# Patient Record
Sex: MALE | Race: WHITE | NOT HISPANIC OR LATINO | Employment: FULL TIME | URBAN - METROPOLITAN AREA
[De-identification: names, ages, dates, MRNs, and addresses within clinical notes are randomized per-mention and may not be internally consistent; named-entity substitution may affect disease eponyms.]

---

## 2021-05-12 ENCOUNTER — OFFICE VISIT (OUTPATIENT)
Dept: CARDIOLOGY | Facility: CLINIC | Age: 51
End: 2021-05-12

## 2021-05-12 VITALS
OXYGEN SATURATION: 97 % | SYSTOLIC BLOOD PRESSURE: 116 MMHG | BODY MASS INDEX: 27.85 KG/M2 | DIASTOLIC BLOOD PRESSURE: 70 MMHG | WEIGHT: 205.6 LBS | HEIGHT: 72 IN | HEART RATE: 65 BPM

## 2021-05-12 DIAGNOSIS — E78.00 PURE HYPERCHOLESTEROLEMIA: ICD-10-CM

## 2021-05-12 DIAGNOSIS — I25.10 CORONARY ARTERY DISEASE INVOLVING NATIVE CORONARY ARTERY OF NATIVE HEART, ANGINA PRESENCE UNSPECIFIED: Primary | ICD-10-CM

## 2021-05-12 DIAGNOSIS — I10 ESSENTIAL HYPERTENSION: ICD-10-CM

## 2021-05-12 DIAGNOSIS — I25.118 CORONARY ARTERY DISEASE INVOLVING NATIVE CORONARY ARTERY OF NATIVE HEART WITH OTHER FORM OF ANGINA PECTORIS (HCC): Primary | ICD-10-CM

## 2021-05-12 DIAGNOSIS — Z72.0 TOBACCO USE: ICD-10-CM

## 2021-05-12 DIAGNOSIS — E78.00 PURE HYPERCHOLESTEROLEMIA: Primary | ICD-10-CM

## 2021-05-12 PROCEDURE — 93000 ELECTROCARDIOGRAM COMPLETE: CPT | Performed by: INTERNAL MEDICINE

## 2021-05-12 PROCEDURE — 99204 OFFICE O/P NEW MOD 45 MIN: CPT | Performed by: INTERNAL MEDICINE

## 2021-05-12 RX ORDER — CHLORAL HYDRATE 500 MG
CAPSULE ORAL
COMMUNITY

## 2021-05-12 RX ORDER — NITROGLYCERIN 400 UG/1
1 SPRAY ORAL
COMMUNITY

## 2021-05-12 RX ORDER — ASPIRIN 81 MG/1
81 TABLET, CHEWABLE ORAL DAILY
COMMUNITY

## 2021-05-12 RX ORDER — ATORVASTATIN CALCIUM 40 MG/1
40 TABLET, FILM COATED ORAL DAILY
Qty: 30 TABLET | Refills: 5 | Status: SHIPPED | OUTPATIENT
Start: 2021-05-12 | End: 2021-11-19

## 2021-05-12 RX ORDER — LISINOPRIL 5 MG/1
5 TABLET ORAL DAILY
COMMUNITY

## 2021-05-12 RX ORDER — MULTIVIT WITH MINERALS/LUTEIN
250 TABLET ORAL DAILY
COMMUNITY

## 2021-05-12 RX ORDER — ATORVASTATIN CALCIUM 80 MG/1
80 TABLET, FILM COATED ORAL DAILY
COMMUNITY
End: 2021-05-12 | Stop reason: DRUGHIGH

## 2021-05-12 RX ORDER — CLOPIDOGREL BISULFATE 75 MG/1
75 TABLET ORAL DAILY
COMMUNITY

## 2021-05-12 NOTE — PROGRESS NOTES
Subjective:     Encounter Date:05/12/2021    Primary Care Physician: Richard Hale DO      Patient ID: Terrell Ashford is a 50 y.o. male.    Chief Complaint:Advice Only (past due on stress test) and Fatigue    PROBLEM LIST:  1. Coronary artery disease  a. 2006 [facility] reported stenting, incomplete database.  b. 6/2007 [facility] for MI no significant left main disease.  Proximally occluded LAD distally fills via right to left collaterals (3.5 x 15 ) with a 4 x 15  stent placed just proximally in tandem fashion..  Circumflex luminal irregularities.  RCA mid 70%.  EF 45 to 50%  c. 12/1/2018 anterior MI occluded proximal LAD balloon angioplasty 3.5 x 20 mm balloon, Dr. Jara.  70 to 80% proximal to mid RCA.  Circumflex free of disease.  No LVEF documented.  2. Hypertension  3. Dyslipidemia  4. Ongoing tobacco abuse  5. COVID 19 4/2021  a. Mild symptoms  6. Surgeries:  a. None       Allergies   Allergen Reactions   • Chantix [Varenicline Tartrate] Delirium         Current Outpatient Medications:   •  aspirin 81 MG chewable tablet, Chew 81 mg Daily., Disp: , Rfl:   •  atorvastatin (LIPITOR) 80 MG tablet, Take 80 mg by mouth Daily., Disp: , Rfl:   •  clopidogrel (PLAVIX) 75 MG tablet, Take 75 mg by mouth Daily., Disp: , Rfl:   •  lisinopril (PRINIVIL,ZESTRIL) 5 MG tablet, Take 5 mg by mouth Daily., Disp: , Rfl:   •  metoprolol tartrate (LOPRESSOR) 25 MG tablet, Take 25 mg by mouth 2 (Two) Times a Day., Disp: , Rfl:   •  nitroglycerin (NITROLINGUAL) 0.4 MG/SPRAY spray, Place 1 spray under the tongue Every 5 (Five) Minutes As Needed for Chest Pain., Disp: , Rfl:   •  Omega-3 Fatty Acids (fish oil) 1000 MG capsule capsule, Take  by mouth Daily With Breakfast., Disp: , Rfl:   •  vitamin C (ASCORBIC ACID) 250 MG tablet, Take 250 mg by mouth Daily., Disp: , Rfl:         History of Present Illness    Patient is a 50-year-old  male who we are seeing today for establishment of cardiac care secondary to  "previous history of coronary artery disease with previous myocardial infarctions and stenting.  Patient recently changed primary care providers and wished to have a cardiologist in [place].  He notes some occasional chest pain.  No specific triggering factors noted.  Does not feel like his anginal symptoms he had with his previous heart attacks.  Denies any increase shortness of breath.  No syncope, near-syncope, or edema.  Complains of \"running out of gas easy\".  Also notes some generalized leg and joint soreness which has been thought to be possibly related to his high intensity statin.  He was referred here for further evaluation and consideration of possible decrease of dosage and/or addition of PCSK9 inhibitor.  Patient notes that he had COVID-19 in April but did not require hospitalization or additional medical therapy.    The following portions of the patient's history were reviewed and updated as appropriate: allergies, current medications, past family history, past medical history, past social history, past surgical history and problem list.    Family History   Problem Relation Age of Onset   • Heart disease Father        Social History     Tobacco Use   • Smoking status: Current Every Day Smoker     Packs/day: 1.00     Years: 30.00     Pack years: 30.00     Types: Cigarettes   Substance Use Topics   • Alcohol use: Not Currently   • Drug use: Never         Review of Systems   Constitutional: Positive for malaise/fatigue. Negative for fever.   HENT: Negative for nosebleeds.    Eyes: Negative for redness and visual disturbance.   Cardiovascular: Positive for chest pain. Negative for orthopnea, palpitations and paroxysmal nocturnal dyspnea.   Respiratory: Negative for cough, snoring, sputum production and wheezing.    Hematologic/Lymphatic: Negative for bleeding problem.   Skin: Negative for flushing, itching and rash.   Musculoskeletal: Positive for arthritis and myalgias. Negative for falls, joint pain " "and muscle cramps.   Gastrointestinal: Negative for abdominal pain, diarrhea, heartburn, nausea and vomiting.   Genitourinary: Negative for hematuria.   Neurological: Negative for excessive daytime sleepiness, dizziness, headaches, tremors and weakness.   Psychiatric/Behavioral: Negative for substance abuse. The patient is not nervous/anxious.           Objective:   /70   Pulse 65   Ht 182.9 cm (72\")   Wt 93.3 kg (205 lb 9.6 oz)   SpO2 97%   BMI 27.88 kg/m²         Physical Exam      ECG 12 Lead    Date/Time: 5/12/2021 3:13 PM  Performed by: Miguel Saleh MD  Authorized by: Miguel Saleh MD   Comparison: not compared with previous ECG   Previous ECG: no previous ECG available  Rhythm: sinus rhythm    Clinical impression: abnormal EKG  Comments: Old AMI                  Assessment:   Assessment/Plan      Diagnoses and all orders for this visit:    1. Coronary artery disease involving native coronary artery of native heart with other form of angina pectoris (CMS/HCC) (Primary)  -     ECG 12 Lead    2. Pure hypercholesterolemia    3. Essential hypertension    4. Tobacco use      1.  Coronary artery disease status post 2 anterior MIs in the remote past.  No recent LVEF.  No current angina.  2.  Dyslipidemia last LDL of 37 on high-dose statin.  Severe myalgias from high-dose statin.  3.  Hypertension well-controlled on beta-blocker and ACE inhibitor  4.  Ongoing tobacco abuse, intolerant to Chantix    Recommendations:  1.  We will check LVEF with echocardiogram.  2.  Stress myocardial perfusion images, no ischemic evaluation in 4 years with little if any warning symptoms prior to his myocardial infarction stent 3.  Given his severe myalgias and LDL of only 37 will decrease the atorvastatin to 40 mg nightly.  Recheck fasting lipid profile in 3 months time.  4.  Discussed smoking cessation strategies.  5.  Revisit annually apparent symptom change       Leslie HADLEY scribed portions of this " dictation for Dr. Miguel Saleh.   I have seen and examined the patient, I have reviewed the note, discussed the case with the advance practice clinician, made necessary changes and I agree with the final note.    Miguel Saleh MD  05/12/21  15:59 EDT        Dictated utilizing Dragon dictation

## 2021-08-04 ENCOUNTER — HOSPITAL ENCOUNTER (OUTPATIENT)
Dept: CARDIOLOGY | Facility: HOSPITAL | Age: 51
Discharge: HOME | End: 2021-08-04
Admitting: INTERNAL MEDICINE

## 2021-08-04 VITALS — WEIGHT: 205 LBS | HEIGHT: 71 IN | BODY MASS INDEX: 28.7 KG/M2

## 2021-08-04 DIAGNOSIS — I25.10 CORONARY ARTERY DISEASE INVOLVING NATIVE CORONARY ARTERY OF NATIVE HEART, ANGINA PRESENCE UNSPECIFIED: ICD-10-CM

## 2021-08-04 PROCEDURE — 93306 TTE W/DOPPLER COMPLETE: CPT | Performed by: INTERNAL MEDICINE

## 2021-08-04 PROCEDURE — 93306 TTE W/DOPPLER COMPLETE: CPT

## 2021-08-05 LAB
ASCENDING AORTA: 3.7 CM
BH CV ECHO MEAS - AO MAX PG (FULL): 6.6 MMHG
BH CV ECHO MEAS - AO MAX PG: 9.2 MMHG
BH CV ECHO MEAS - AO MEAN PG (FULL): 4 MMHG
BH CV ECHO MEAS - AO MEAN PG: 5.5 MMHG
BH CV ECHO MEAS - AO ROOT AREA (BSA CORRECTED): 1.6
BH CV ECHO MEAS - AO ROOT AREA: 9.6 CM^2
BH CV ECHO MEAS - AO ROOT DIAM: 3.5 CM
BH CV ECHO MEAS - AO V2 MAX: 152 CM/SEC
BH CV ECHO MEAS - AO V2 MEAN: 110.3 CM/SEC
BH CV ECHO MEAS - AO V2 VTI: 31.1 CM
BH CV ECHO MEAS - ASC AORTA: 3.6 CM
BH CV ECHO MEAS - AVA(I,A): 2.1 CM^2
BH CV ECHO MEAS - AVA(I,D): 2.1 CM^2
BH CV ECHO MEAS - AVA(V,A): 1.9 CM^2
BH CV ECHO MEAS - AVA(V,D): 1.9 CM^2
BH CV ECHO MEAS - BSA(HAYCOCK): 2.2 M^2
BH CV ECHO MEAS - BSA: 2.2 M^2
BH CV ECHO MEAS - BZI_BMI: 27.8 KILOGRAMS/M^2
BH CV ECHO MEAS - BZI_METRIC_HEIGHT: 182.9 CM
BH CV ECHO MEAS - BZI_METRIC_WEIGHT: 93 KG
BH CV ECHO MEAS - EDV(CUBED): 213.5 ML
BH CV ECHO MEAS - EDV(MOD-SP2): 186 ML
BH CV ECHO MEAS - EDV(MOD-SP4): 196 ML
BH CV ECHO MEAS - EDV(TEICH): 178.4 ML
BH CV ECHO MEAS - EF(CUBED): 52.4 %
BH CV ECHO MEAS - EF(MOD-BP): 40 %
BH CV ECHO MEAS - EF(MOD-SP2): 60.8 %
BH CV ECHO MEAS - EF(MOD-SP4): 29.1 %
BH CV ECHO MEAS - EF(TEICH): 43.6 %
BH CV ECHO MEAS - ESV(CUBED): 101.6 ML
BH CV ECHO MEAS - ESV(MOD-SP2): 73 ML
BH CV ECHO MEAS - ESV(MOD-SP4): 139 ML
BH CV ECHO MEAS - ESV(TEICH): 100.6 ML
BH CV ECHO MEAS - FS: 21.9 %
BH CV ECHO MEAS - IVS/LVPW: 0.74
BH CV ECHO MEAS - IVSD: 0.75 CM
BH CV ECHO MEAS - LA DIMENSION: 3.4 CM
BH CV ECHO MEAS - LA/AO: 0.97
BH CV ECHO MEAS - LAD MAJOR: 5 CM
BH CV ECHO MEAS - LAT PEAK E' VEL: 10 CM/SEC
BH CV ECHO MEAS - LATERAL E/E' RATIO: 10.5
BH CV ECHO MEAS - LV DIASTOLIC VOL/BSA (35-75): 91 ML/M^2
BH CV ECHO MEAS - LV IVRT: 0.08 SEC
BH CV ECHO MEAS - LV MASS(C)D: 209.4 GRAMS
BH CV ECHO MEAS - LV MASS(C)DI: 97.3 GRAMS/M^2
BH CV ECHO MEAS - LV MAX PG: 2.6 MMHG
BH CV ECHO MEAS - LV MEAN PG: 1.5 MMHG
BH CV ECHO MEAS - LV SYSTOLIC VOL/BSA (12-30): 64.6 ML/M^2
BH CV ECHO MEAS - LV V1 MAX: 80.8 CM/SEC
BH CV ECHO MEAS - LV V1 MEAN: 56.5 CM/SEC
BH CV ECHO MEAS - LV V1 VTI: 18.9 CM
BH CV ECHO MEAS - LVIDD: 6 CM
BH CV ECHO MEAS - LVIDS: 4.7 CM
BH CV ECHO MEAS - LVLD AP2: 9.2 CM
BH CV ECHO MEAS - LVLD AP4: 9.4 CM
BH CV ECHO MEAS - LVLS AP2: 7.8 CM
BH CV ECHO MEAS - LVLS AP4: 7.9 CM
BH CV ECHO MEAS - LVOT AREA (M): 3.5 CM^2
BH CV ECHO MEAS - LVOT AREA: 3.5 CM^2
BH CV ECHO MEAS - LVOT DIAM: 2.1 CM
BH CV ECHO MEAS - LVPWD: 1 CM
BH CV ECHO MEAS - MED PEAK E' VEL: 12.2 CM/SEC
BH CV ECHO MEAS - MEDIAL E/E' RATIO: 8.6
BH CV ECHO MEAS - MV A MAX VEL: 68.5 CM/SEC
BH CV ECHO MEAS - MV DEC SLOPE: 518.2 CM/SEC^2
BH CV ECHO MEAS - MV DEC TIME: 0.18 SEC
BH CV ECHO MEAS - MV E MAX VEL: 106.7 CM/SEC
BH CV ECHO MEAS - MV E/A: 1.6
BH CV ECHO MEAS - MV MAX PG: 4.3 MMHG
BH CV ECHO MEAS - MV MEAN PG: 1.6 MMHG
BH CV ECHO MEAS - MV P1/2T MAX VEL: 112 CM/SEC
BH CV ECHO MEAS - MV P1/2T: 63.3 MSEC
BH CV ECHO MEAS - MV V2 MAX: 103.2 CM/SEC
BH CV ECHO MEAS - MV V2 MEAN: 58.1 CM/SEC
BH CV ECHO MEAS - MV V2 VTI: 34.8 CM
BH CV ECHO MEAS - MVA P1/2T LCG: 2 CM^2
BH CV ECHO MEAS - MVA(P1/2T): 3.5 CM^2
BH CV ECHO MEAS - MVA(VTI): 1.9 CM^2
BH CV ECHO MEAS - PA ACC SLOPE: 620.8 CM/SEC^2
BH CV ECHO MEAS - PA ACC TIME: 0.16 SEC
BH CV ECHO MEAS - PA MAX PG: 5.8 MMHG
BH CV ECHO MEAS - PA PR(ACCEL): 5.3 MMHG
BH CV ECHO MEAS - PA V2 MAX: 120.5 CM/SEC
BH CV ECHO MEAS - RAP SYSTOLE: 3 MMHG
BH CV ECHO MEAS - RVSP: 28 MMHG
BH CV ECHO MEAS - SI(AO): 138.7 ML/M^2
BH CV ECHO MEAS - SI(CUBED): 52 ML/M^2
BH CV ECHO MEAS - SI(LVOT): 31 ML/M^2
BH CV ECHO MEAS - SI(MOD-SP2): 52.5 ML/M^2
BH CV ECHO MEAS - SI(MOD-SP4): 26.5 ML/M^2
BH CV ECHO MEAS - SI(TEICH): 36.1 ML/M^2
BH CV ECHO MEAS - SV(AO): 298.7 ML
BH CV ECHO MEAS - SV(CUBED): 111.9 ML
BH CV ECHO MEAS - SV(LVOT): 66.7 ML
BH CV ECHO MEAS - SV(MOD-SP2): 113 ML
BH CV ECHO MEAS - SV(MOD-SP4): 57 ML
BH CV ECHO MEAS - SV(TEICH): 77.8 ML
BH CV ECHO MEAS - TAPSE (>1.6): 2.3 CM
BH CV ECHO MEAS - TR MAX PG: 25 MMHG
BH CV ECHO MEAS - TR MAX VEL: 250 CM/SEC
BH CV ECHO MEASUREMENTS AVERAGE E/E' RATIO: 9.61
BH CV VAS BP RIGHT ARM: NORMAL MMHG
BH CV XLRA - RV BASE: 3.9 CM
BH CV XLRA - RV LENGTH: 8.2 CM
BH CV XLRA - RV MID: 2.4 CM
BH CV XLRA - TDI S': 17 CM/SEC
IVRT: 77 MSEC
LEFT ATRIUM VOLUME INDEX: 22.3 ML/M^2
LEFT ATRIUM VOLUME: 48 ML
LV EF 2D ECHO EST: 40 %

## 2021-10-07 ENCOUNTER — TELEPHONE (OUTPATIENT)
Dept: CARDIOLOGY | Facility: CLINIC | Age: 51
End: 2021-10-07

## 2021-10-07 NOTE — TELEPHONE ENCOUNTER
Requesting written and oral risk assessment for dental extractions scheduled for 2:00 today.   Stable

## 2021-11-19 RX ORDER — ATORVASTATIN CALCIUM 40 MG/1
TABLET, FILM COATED ORAL
Qty: 90 TABLET | Refills: 1 | Status: SHIPPED | OUTPATIENT
Start: 2021-11-19 | End: 2022-05-23

## 2022-01-24 ENCOUNTER — TRANSCRIBE ORDERS (OUTPATIENT)
Dept: ADMINISTRATIVE | Facility: HOSPITAL | Age: 52
End: 2022-01-24

## 2022-01-24 DIAGNOSIS — R07.9 CHEST PAIN, UNSPECIFIED TYPE: Primary | ICD-10-CM

## 2022-02-17 ENCOUNTER — HOSPITAL ENCOUNTER (OUTPATIENT)
Dept: CARDIOLOGY | Facility: HOSPITAL | Age: 52
Discharge: HOME | End: 2022-02-17
Admitting: INTERNAL MEDICINE

## 2022-02-17 VITALS
WEIGHT: 205 LBS | HEART RATE: 71 BPM | BODY MASS INDEX: 28.7 KG/M2 | DIASTOLIC BLOOD PRESSURE: 78 MMHG | SYSTOLIC BLOOD PRESSURE: 110 MMHG | HEIGHT: 71 IN

## 2022-02-17 DIAGNOSIS — R07.9 CHEST PAIN, UNSPECIFIED TYPE: ICD-10-CM

## 2022-02-17 LAB
BH CV REST NUCLEAR ISOTOPE DOSE: 9.1 MCI
BH CV STRESS BP STAGE 1: NORMAL
BH CV STRESS BP STAGE 2: NORMAL
BH CV STRESS BP STAGE 3: NORMAL
BH CV STRESS DURATION MIN STAGE 1: 3
BH CV STRESS DURATION MIN STAGE 2: 3
BH CV STRESS DURATION MIN STAGE 3: 3
BH CV STRESS DURATION MIN STAGE 4: 1
BH CV STRESS DURATION SEC STAGE 1: 0
BH CV STRESS DURATION SEC STAGE 2: 0
BH CV STRESS DURATION SEC STAGE 3: 0
BH CV STRESS DURATION SEC STAGE 4: 15
BH CV STRESS GRADE STAGE 1: 10
BH CV STRESS GRADE STAGE 2: 12
BH CV STRESS GRADE STAGE 3: 14
BH CV STRESS GRADE STAGE 4: 16
BH CV STRESS HR STAGE 1: 101
BH CV STRESS HR STAGE 2: 122
BH CV STRESS HR STAGE 3: 139
BH CV STRESS HR STAGE 4: 155
BH CV STRESS METS STAGE 1: 5
BH CV STRESS METS STAGE 2: 7.5
BH CV STRESS METS STAGE 3: 10
BH CV STRESS METS STAGE 4: 13.5
BH CV STRESS NUCLEAR ISOTOPE DOSE: 32.9 MCI
BH CV STRESS O2 STAGE 1: 96
BH CV STRESS O2 STAGE 2: 97
BH CV STRESS O2 STAGE 3: 96
BH CV STRESS O2 STAGE 4: 96
BH CV STRESS PROTOCOL 1: NORMAL
BH CV STRESS RECOVERY BP: NORMAL MMHG
BH CV STRESS RECOVERY HR: 88 BPM
BH CV STRESS RECOVERY O2: 97 %
BH CV STRESS SPEED STAGE 1: 1.7
BH CV STRESS SPEED STAGE 2: 2.5
BH CV STRESS SPEED STAGE 3: 3.4
BH CV STRESS SPEED STAGE 4: 4.2
BH CV STRESS STAGE 1: 1
BH CV STRESS STAGE 2: 2
BH CV STRESS STAGE 3: 3
BH CV STRESS STAGE 4: 4
LV EF NUC BP: 43 %
MAXIMAL PREDICTED HEART RATE: 169 BPM
PERCENT MAX PREDICTED HR: 92.9 %
STRESS BASELINE BP: NORMAL MMHG
STRESS BASELINE HR: 62 BPM
STRESS O2 SAT REST: 96 %
STRESS PERCENT HR: 109 %
STRESS POST ESTIMATED WORKLOAD: 12.1 METS
STRESS POST EXERCISE DUR MIN: 10 MIN
STRESS POST EXERCISE DUR SEC: 15 SEC
STRESS POST O2 SAT PEAK: 96 %
STRESS POST PEAK BP: NORMAL MMHG
STRESS POST PEAK HR: 157 BPM
STRESS TARGET HR: 144 BPM

## 2022-02-17 PROCEDURE — 78452 HT MUSCLE IMAGE SPECT MULT: CPT | Performed by: INTERNAL MEDICINE

## 2022-02-17 PROCEDURE — 93017 CV STRESS TEST TRACING ONLY: CPT

## 2022-02-17 PROCEDURE — 93018 CV STRESS TEST I&R ONLY: CPT | Performed by: INTERNAL MEDICINE

## 2022-02-17 PROCEDURE — 78452 HT MUSCLE IMAGE SPECT MULT: CPT

## 2022-02-17 PROCEDURE — 0 TECHNETIUM SESTAMIBI: Performed by: INTERNAL MEDICINE

## 2022-02-17 PROCEDURE — A9500 TC99M SESTAMIBI: HCPCS | Performed by: INTERNAL MEDICINE

## 2022-02-17 RX ADMIN — TECHNETIUM TC 99M SESTAMIBI 1 DOSE: 1 INJECTION INTRAVENOUS at 13:50

## 2022-02-17 RX ADMIN — TECHNETIUM TC 99M SESTAMIBI 1 DOSE: 1 INJECTION INTRAVENOUS at 12:05

## 2022-03-07 ENCOUNTER — APPOINTMENT (OUTPATIENT)
Dept: CARDIOLOGY | Facility: HOSPITAL | Age: 52
End: 2022-03-07

## 2022-03-24 ENCOUNTER — TELEPHONE (OUTPATIENT)
Dept: CARDIOLOGY | Facility: CLINIC | Age: 52
End: 2022-03-24

## 2022-03-24 DIAGNOSIS — R94.39 ABNORMAL STRESS TEST: ICD-10-CM

## 2022-03-24 DIAGNOSIS — I10 ESSENTIAL HYPERTENSION: Primary | ICD-10-CM

## 2022-03-24 NOTE — TELEPHONE ENCOUNTER
Left VM advising of below and to call office with questions or concerns    ----- Message from JOMAR Smalls sent at 3/23/2022  4:07 PM EDT -----  Please call patient and let him know that his stress test shows his old heart attack.  There is some mild chris-infarct ischemia.  However, if patient is asymptomatic no further testing needed at this time and may continue medical therapy.  However, if patient is having symptoms would recommend cardiac catheterization plus or minus intervention.

## 2022-03-24 NOTE — PROGRESS NOTES
Spoke with patient, he states thinks heart cath is way to proceed, given his previous episodes, as well as PCP was leaning that way.  Advised  will contact to set up.

## 2022-04-12 PROBLEM — R94.39 ABNORMAL STRESS TEST: Status: ACTIVE | Noted: 2022-04-12

## 2022-04-21 ENCOUNTER — TELEPHONE (OUTPATIENT)
Dept: CARDIOLOGY | Facility: CLINIC | Age: 52
End: 2022-04-21

## 2022-04-21 NOTE — TELEPHONE ENCOUNTER
Spoke with patient, he states he would like to wait and discuss his stress test with both Dr. Saleh and Dr. Hale.  He states he has no symptoms at this time. He has an appt scheduled for 5/25/22 and will discuss results/options at that time.

## 2022-05-23 RX ORDER — ATORVASTATIN CALCIUM 40 MG/1
TABLET, FILM COATED ORAL
Qty: 90 TABLET | Refills: 1 | Status: SHIPPED | OUTPATIENT
Start: 2022-05-23

## 2022-05-25 ENCOUNTER — OFFICE VISIT (OUTPATIENT)
Dept: CARDIOLOGY | Facility: CLINIC | Age: 52
End: 2022-05-25

## 2022-05-25 VITALS
HEART RATE: 65 BPM | WEIGHT: 202 LBS | BODY MASS INDEX: 27.36 KG/M2 | SYSTOLIC BLOOD PRESSURE: 114 MMHG | OXYGEN SATURATION: 97 % | HEIGHT: 72 IN | DIASTOLIC BLOOD PRESSURE: 60 MMHG

## 2022-05-25 DIAGNOSIS — I10 ESSENTIAL HYPERTENSION: ICD-10-CM

## 2022-05-25 DIAGNOSIS — Z72.0 TOBACCO USE: ICD-10-CM

## 2022-05-25 DIAGNOSIS — I25.118 CORONARY ARTERY DISEASE INVOLVING NATIVE CORONARY ARTERY OF NATIVE HEART WITH OTHER FORM OF ANGINA PECTORIS: Primary | ICD-10-CM

## 2022-05-25 DIAGNOSIS — R94.39 ABNORMAL STRESS TEST: ICD-10-CM

## 2022-05-25 DIAGNOSIS — E78.00 PURE HYPERCHOLESTEROLEMIA: ICD-10-CM

## 2022-05-25 PROCEDURE — 99214 OFFICE O/P EST MOD 30 MIN: CPT | Performed by: INTERNAL MEDICINE

## 2022-05-25 NOTE — PROGRESS NOTES
Subjective:     Encounter Date:05/25/2022    Primary Care Physician: Richard Hale DO      Patient ID: Terrell Ashford is a 51 y.o. male.    Chief Complaint:Follow-up    PROBLEM LIST:  1. Coronary artery disease  a. 2006 [facility] reported stenting, incomplete database.  b. 6/2007 [facility] for MI no significant left main disease.  Proximally occluded LAD distally fills via right to left collaterals (3.5 x 15 ) with a 4 x 15  stent placed just proximally in tandem fashion..  Circumflex luminal irregularities.  RCA mid 70%.  EF 45 to 50%  c. 12/1/2018 anterior MI occluded proximal LAD balloon angioplasty 3.5 x 20 mm balloon, Dr. Jara.  70 to 80% proximal to mid RCA.  Circumflex free of disease.  No LVEF documented.  d. 2/17/2022 [facility] large sized anterior infarct with mild chris-infarct ischemia.  EF of 43%.  2. Hypertension  3. Dyslipidemia  4. Ongoing tobacco abuse  5. COVID 19 4/2021  a. Mild symptoms  6. Surgeries:  a. None       Allergies   Allergen Reactions   • Chantix [Varenicline Tartrate] Delirium   • Rosuvastatin Calcium Myalgia         Current Outpatient Medications:   •  aspirin 81 MG chewable tablet, Chew 81 mg Daily., Disp: , Rfl:   •  atorvastatin (LIPITOR) 40 MG tablet, TAKE 1 TABLET BY MOUTH EVERY DAY, Disp: 90 tablet, Rfl: 1  •  clopidogrel (PLAVIX) 75 MG tablet, Take 75 mg by mouth Daily., Disp: , Rfl:   •  lisinopril (PRINIVIL,ZESTRIL) 5 MG tablet, Take 5 mg by mouth Daily., Disp: , Rfl:   •  metoprolol tartrate (LOPRESSOR) 25 MG tablet, Take 12.5 mg by mouth Daily., Disp: , Rfl:   •  nitroglycerin (NITROLINGUAL) 0.4 MG/SPRAY spray, Place 1 spray under the tongue Every 5 (Five) Minutes As Needed for Chest Pain., Disp: , Rfl:   •  Omega-3 Fatty Acids (fish oil) 1000 MG capsule capsule, Take  by mouth Daily With Breakfast., Disp: , Rfl:   •  vitamin C (ASCORBIC ACID) 250 MG tablet, Take 250 mg by mouth Daily., Disp: , Rfl:         History of Present Illness    Patient returns  "today for follow-up of episode of syncope.  He was doing well, notes in February he had an episode when he was playing with his children of severe nausea severe diaphoresis and the feeling of \"warmth in.  He got up to try to walk downstairs to lay down, when he decided to turn around and take a nitroglycerin.  He did not have any chest pain.  After taking this he notes he slumped over on the bed and \"passed out\".  His wife noted some seizure-like activity called the rescue squad he awoken for the rescue squad got there and was taken to [facility].  He was treated and released there without any diagnosis.  No change in medication.  Since that time he is back to normal work no exertional chest pain no shortness of breath orthopnea PND tachypalpitations or dizziness    The following portions of the patient's history were reviewed and updated as appropriate: allergies, current medications, past family history, past medical history, past social history, past surgical history and problem list.      Social History     Tobacco Use   • Smoking status: Current Every Day Smoker     Packs/day: 1.00     Years: 30.00     Pack years: 30.00     Types: Cigarettes   Substance Use Topics   • Alcohol use: Not Currently   • Drug use: Never         ROS       Objective:   /60   Pulse 65   Ht 182.9 cm (72\")   Wt 91.6 kg (202 lb)   SpO2 97%   BMI 27.40 kg/m²         Vitals reviewed.   Constitutional:       Appearance: Well-developed and not in distress.   Neck:      Thyroid: No thyromegaly.      Vascular: No carotid bruit or JVD.   Pulmonary:      Breath sounds: Normal breath sounds.   Cardiovascular:      Regular rhythm.      No gallop. No S3 and S4 gallop.   Abdominal:      General: Bowel sounds are normal.      Palpations: Abdomen is soft. There is no abdominal mass.      Tenderness: There is no abdominal tenderness.   Musculoskeletal:         General: No deformity.      Extremities: No clubbing present.Skin:     " General: Skin is warm and dry.      Findings: No rash.   Neurological:      Mental Status: Alert and oriented to person, place, and time.         Procedures          Assessment:   Assessment & Plan      Problems Addressed this Visit        Cardiac and Vasculature    Essential hypertension    Pure hypercholesterolemia    Coronary artery disease - Primary    Abnormal stress test       Tobacco    Tobacco use      Diagnoses       Codes Comments    Coronary artery disease involving native coronary artery of native heart with other form of angina pectoris (HCC)    -  Primary ICD-10-CM: I25.118  ICD-9-CM: 414.01, 413.9     Abnormal stress test     ICD-10-CM: R94.39  ICD-9-CM: 794.39     Essential hypertension     ICD-10-CM: I10  ICD-9-CM: 401.9     Pure hypercholesterolemia     ICD-10-CM: E78.00  ICD-9-CM: 272.0     Tobacco use     ICD-10-CM: Z72.0  ICD-9-CM: 305.1         1.  Coronary artery disease.  Status post anterior MI and multivessel stenting.  No current angina.  Stress test earlier this year showed anterior apical infarction with chris-infarction ischemia (mild).  2.  Tobacco abuse ongoing  3.  Episode of syncope.  Most consistent with vasovagal.  Probably exacerbated by taking nitroglycerin.  4.  Dyslipidemia last LDL of 61 on moderate to high intensity statin  5.  Hypertension well-controlled    Recommendations:  1.  Long discussion regarding options today.  His symptoms psychosis in the vasovagal sympathy and has had no episodes in 3 months.  2.  We will simply continue current medical therapy  3.  If he develops any exertional symptoms he will contact us for possible evaluation otherwise conservative given his largely infarcted anterior wall.  4.  Revisit 6 months time apparent symptom change    Miguel Saleh MD             Dictated utilizing Dragon dictation

## 2023-01-24 DIAGNOSIS — I25.118 CORONARY ARTERY DISEASE INVOLVING NATIVE CORONARY ARTERY OF NATIVE HEART WITH OTHER FORM OF ANGINA PECTORIS: Primary | ICD-10-CM

## 2023-01-31 ENCOUNTER — TRANSCRIBE ORDERS (OUTPATIENT)
Dept: ADMINISTRATIVE | Facility: HOSPITAL | Age: 53
End: 2023-01-31
Payer: COMMERCIAL

## 2023-01-31 DIAGNOSIS — R55 SYNCOPE AND COLLAPSE: Primary | ICD-10-CM

## 2023-02-07 ENCOUNTER — OFFICE VISIT (OUTPATIENT)
Dept: CARDIOLOGY | Facility: CLINIC | Age: 53
End: 2023-02-07
Payer: COMMERCIAL

## 2023-02-07 VITALS
DIASTOLIC BLOOD PRESSURE: 74 MMHG | WEIGHT: 207 LBS | HEART RATE: 81 BPM | BODY MASS INDEX: 28.04 KG/M2 | OXYGEN SATURATION: 98 % | HEIGHT: 72 IN | SYSTOLIC BLOOD PRESSURE: 122 MMHG

## 2023-02-07 DIAGNOSIS — I25.118 CORONARY ARTERY DISEASE INVOLVING NATIVE CORONARY ARTERY OF NATIVE HEART WITH OTHER FORM OF ANGINA PECTORIS: Primary | ICD-10-CM

## 2023-02-07 DIAGNOSIS — E78.00 PURE HYPERCHOLESTEROLEMIA: ICD-10-CM

## 2023-02-07 DIAGNOSIS — I10 ESSENTIAL HYPERTENSION: ICD-10-CM

## 2023-02-07 DIAGNOSIS — Z72.0 TOBACCO USE: ICD-10-CM

## 2023-02-07 PROCEDURE — 99213 OFFICE O/P EST LOW 20 MIN: CPT | Performed by: INTERNAL MEDICINE

## 2023-02-07 NOTE — PROGRESS NOTES
Subjective:     Encounter Date:02/07/2023    Primary Care Physician: Richard Hale DO      Patient ID: Terrell Ashford is a 52 y.o. male.    Chief Complaint:Coronary Artery Disease    PROBLEM LIST:  1. Coronary artery disease  a. 2006 [facility] reported stenting, incomplete database.  b. 6/2007 [facility] for MI no significant left main disease.  Proximally occluded LAD distally fills via right to left collaterals (3.5 x 15 ) with a 4 x 15  stent placed just proximally in tandem fashion..  Circumflex luminal irregularities.  RCA mid 70%.  EF 45 to 50%  c. 12/1/2018 anterior MI occluded proximal LAD balloon angioplasty 3.5 x 20 mm balloon, Dr. Jara.  70 to 80% proximal to mid RCA.  Circumflex free of disease.  No LVEF documented.  d. 2/17/2022 [facility] large sized anterior infarct with mild chris-infarct ischemia.  EF of 43%.  2. Hypertension  3. Dyslipidemia  4. Ongoing tobacco abuse  5. COVID 19 4/2021  a. Mild symptoms  6. Surgeries:  a. None       Allergies   Allergen Reactions   • Chantix [Varenicline Tartrate] Delirium   • Rosuvastatin Calcium Myalgia         Current Outpatient Medications:   •  aspirin 81 MG chewable tablet, Chew 81 mg Daily., Disp: , Rfl:   •  atorvastatin (LIPITOR) 40 MG tablet, TAKE 1 TABLET BY MOUTH EVERY DAY, Disp: 90 tablet, Rfl: 1  •  clopidogrel (PLAVIX) 75 MG tablet, Take 75 mg by mouth Daily., Disp: , Rfl:   •  lisinopril (PRINIVIL,ZESTRIL) 5 MG tablet, Take 5 mg by mouth Daily., Disp: , Rfl:   •  metoprolol tartrate (LOPRESSOR) 25 MG tablet, Take 12.5 mg by mouth Daily., Disp: , Rfl:   •  nitroglycerin (NITROLINGUAL) 0.4 MG/SPRAY spray, Place 1 spray under the tongue Every 5 (Five) Minutes As Needed for Chest Pain., Disp: , Rfl:   •  Omega-3 Fatty Acids (fish oil) 1000 MG capsule capsule, Take  by mouth Daily With Breakfast., Disp: , Rfl:   •  vitamin C (ASCORBIC ACID) 250 MG tablet, Take 250 mg by mouth Daily., Disp: , Rfl:         History of Present  "Illness    Patient returns today for annual follow-up of coronary disease and risk factors.  Since her last visit, he is overall doing very well.  He thinks he feels \"better than last year\".  He is very active and only complaint is of a right shoulder Tatar cuff injury that he suffered this year.  Does continue to smoke 1 pack/day    The following portions of the patient's history were reviewed and updated as appropriate: allergies, current medications, past family history, past medical history, past social history, past surgical history and problem list.      Social History     Tobacco Use   • Smoking status: Every Day     Packs/day: 1.00     Years: 30.00     Pack years: 30.00     Types: Cigarettes   Substance Use Topics   • Alcohol use: Not Currently   • Drug use: Never         ROS       Objective:   /74 (BP Location: Right arm, Patient Position: Sitting)   Pulse 81   Ht 182.9 cm (72\")   Wt 93.9 kg (207 lb)   SpO2 98%   BMI 28.07 kg/m²         Vitals reviewed.   Constitutional:       Appearance: Well-developed and not in distress.   Neck:      Thyroid: No thyromegaly.      Vascular: No carotid bruit or JVD.   Pulmonary:      Breath sounds: Normal breath sounds.   Cardiovascular:      Regular rhythm.      No gallop. No S3 and S4 gallop.   Abdominal:      General: Bowel sounds are normal.      Palpations: Abdomen is soft. There is no abdominal mass.      Tenderness: There is no abdominal tenderness.   Musculoskeletal:         General: No deformity.      Extremities: No clubbing present.Skin:     General: Skin is warm and dry.      Findings: No rash.   Neurological:      Mental Status: Alert and oriented to person, place, and time.         Procedures          Assessment:   Assessment & Plan      Diagnoses and all orders for this visit:    1. Coronary artery disease involving native coronary artery of native heart with other form of angina pectoris (HCC) (Primary)    2. Pure hypercholesterolemia    3. " Essential hypertension    4. Tobacco use      1.  Coronary artery disease, status post remote MI and PCI 2018.  No angina.  Functional class I.  2.  Ongoing tobacco abuse 1 pack/day  3.  Dyslipidemia LDL of 61 2 weeks ago.  4.  Hypertension well-controlled on current medical therapy    Recommendations:  1.  Spent greater than 3 minutes counseling tobacco cessation and techniques.  2.  Continue current medical therapy.  3.  Revisit annually apparent symptom change     Miguel Saleh MD                Dictated utilizing Dragon dictation

## 2024-02-05 NOTE — PROGRESS NOTES
Subjective:     Encounter Date:02/07/2024    Primary Care Physician: Richard Hale DO      Patient ID: Terrell Ashford is a 53 y.o. male.    Chief Complaint:Coronary Artery Disease    PROBLEM LIST:  Coronary artery disease  2006 [facility] reported stenting, incomplete database.  6/2007 [facility] for MI no significant left main disease.  Proximally occluded LAD distally fills via right to left collaterals (3.5 x 15 ) with a 4 x 15  stent placed just proximally in tandem fashion..  Circumflex luminal irregularities.  RCA mid 70%.  EF 45 to 50%  12/1/2018 anterior MI occluded proximal LAD balloon angioplasty 3.5 x 20 mm balloon, Dr. Jara.  70 to 80% proximal to mid RCA.  Circumflex free of disease.  No LVEF documented.  2/17/2022 [facility] large sized anterior infarct with mild chris-infarct ischemia.  EF of 43%.  Hypertension  Dyslipidemia  Ongoing tobacco abuse  COVID 19 4/2021  Mild symptoms  Surgeries:  None       Allergies   Allergen Reactions    Chantix [Varenicline Tartrate] Delirium    Rosuvastatin Calcium Myalgia         Current Outpatient Medications:     aspirin 81 MG chewable tablet, Chew 1 tablet Daily., Disp: , Rfl:     atorvastatin (LIPITOR) 40 MG tablet, TAKE 1 TABLET BY MOUTH EVERY DAY, Disp: 90 tablet, Rfl: 1    clopidogrel (PLAVIX) 75 MG tablet, Take 1 tablet by mouth Daily., Disp: , Rfl:     lisinopril (PRINIVIL,ZESTRIL) 5 MG tablet, Take 1 tablet by mouth Daily., Disp: , Rfl:     metoprolol tartrate (LOPRESSOR) 25 MG tablet, Take 0.5 tablets by mouth Daily., Disp: , Rfl:     nitroglycerin (NITROLINGUAL) 0.4 MG/SPRAY spray, Place 1 spray under the tongue Every 5 (Five) Minutes As Needed for Chest Pain., Disp: , Rfl:     Omega-3 Fatty Acids (fish oil) 1000 MG capsule capsule, Take  by mouth Daily With Breakfast., Disp: , Rfl:     vitamin C (ASCORBIC ACID) 250 MG tablet, Take 1 tablet by mouth Daily., Disp: , Rfl:         History of Present Illness    Patient is a 53-year-old  " male who presents today for annual follow-up of coronary artery disease.  Since last being seen patient notes overall doing well.  He denies any significant chest pain, pressure, tightness.  Denies any increasing shortness of breath.  No reported syncope, presyncope, or edema.  Continues to smoke without plans for cessation.    The following portions of the patient's history were reviewed and updated as appropriate: allergies, current medications, past family history, past medical history, past social history, past surgical history and problem list.      Social History     Tobacco Use    Smoking status: Every Day     Packs/day: 1.00     Years: 30.00     Additional pack years: 0.00     Total pack years: 30.00     Types: Cigarettes   Substance Use Topics    Alcohol use: Not Currently    Drug use: Never         ROS       Objective:   /77   Pulse 73   Ht 182.9 cm (72\")   Wt 94.1 kg (207 lb 6.4 oz)   SpO2 94%   BMI 28.13 kg/m²         Vitals reviewed.   Constitutional:       Appearance: Well-developed and not in distress.   Neck:      Vascular: No JVD.      Trachea: No tracheal deviation.   Pulmonary:      Effort: Pulmonary effort is normal.      Breath sounds: Normal breath sounds.   Cardiovascular:      Normal rate. Regular rhythm.      Murmurs: There is no murmur.      Comments: Right carotid bruit noted  Edema:     Peripheral edema absent.   Musculoskeletal:         General: No deformity. Skin:     General: Skin is warm and dry.   Neurological:      Mental Status: Alert and oriented to person, place, and time.         Procedures          Assessment:   Assessment & Plan      Diagnoses and all orders for this visit:    1. Coronary artery disease involving native coronary artery of native heart without angina pectoris (Primary), stable.  No active angina.  On aspirin.    2. Pure hypercholesterolemia, stable.  Labs with primary care.  On statin.    3. Essential hypertension, controlled.  On ACE " inhibitor and beta-blocker.    4. Bruit of right carotid artery, no previous carotid duplex/evaluation.      Plan:  Patient overall stable from cardiac standpoint.  Given abnormal physical examination findings and ongoing tobacco abuse as well as history of coronary disease we will obtain carotid duplex in the near term.  Continue current cardiac medications.  Follow-up in 1 years time or sooner if needed.     JOMAR Smalls           Dictated utilizing Dragon dictation

## 2024-02-07 ENCOUNTER — OFFICE VISIT (OUTPATIENT)
Dept: CARDIOLOGY | Facility: CLINIC | Age: 54
End: 2024-02-07
Payer: COMMERCIAL

## 2024-02-07 VITALS
BODY MASS INDEX: 28.09 KG/M2 | OXYGEN SATURATION: 94 % | WEIGHT: 207.4 LBS | SYSTOLIC BLOOD PRESSURE: 122 MMHG | HEIGHT: 72 IN | HEART RATE: 73 BPM | DIASTOLIC BLOOD PRESSURE: 77 MMHG

## 2024-02-07 DIAGNOSIS — R09.89 BRUIT OF RIGHT CAROTID ARTERY: ICD-10-CM

## 2024-02-07 DIAGNOSIS — E78.00 PURE HYPERCHOLESTEROLEMIA: ICD-10-CM

## 2024-02-07 DIAGNOSIS — I10 ESSENTIAL HYPERTENSION: ICD-10-CM

## 2024-02-07 DIAGNOSIS — I25.10 CORONARY ARTERY DISEASE INVOLVING NATIVE CORONARY ARTERY OF NATIVE HEART WITHOUT ANGINA PECTORIS: Primary | ICD-10-CM

## 2024-02-07 PROCEDURE — 99214 OFFICE O/P EST MOD 30 MIN: CPT | Performed by: NURSE PRACTITIONER

## 2024-02-07 RX ORDER — NITROGLYCERIN 400 UG/1
1 SPRAY ORAL
Qty: 25 EACH | Refills: 5 | Status: SHIPPED | OUTPATIENT
Start: 2024-02-07

## 2024-02-07 NOTE — LETTER
February 7, 2024       No Recipients    Patient: Terrell Ashford   YOB: 1970   Date of Visit: 2/7/2024     Dear Richard Hale DO:       Thank you for referring Terrell Ashford to me for evaluation. Below are the relevant portions of my assessment and plan of care.    If you have questions, please do not hesitate to call me. I look forward to following Terrell along with you.         Sincerely,        JOMAR Smalls        CC:   No Recipients    Leslie Zaldivar APRN  02/07/24 1549  Sign when Signing Visit  Subjective:     Encounter Date:02/07/2024    Primary Care Physician: Richard Hale DO      Patient ID: Terrell Ashford is a 53 y.o. male.    Chief Complaint:Coronary Artery Disease    PROBLEM LIST:  Coronary artery disease  2006 [facility] reported stenting, incomplete database.  6/2007 [facility] for MI no significant left main disease.  Proximally occluded LAD distally fills via right to left collaterals (3.5 x 15 ) with a 4 x 15  stent placed just proximally in tandem fashion..  Circumflex luminal irregularities.  RCA mid 70%.  EF 45 to 50%  12/1/2018 anterior MI occluded proximal LAD balloon angioplasty 3.5 x 20 mm balloon, Dr. Jara.  70 to 80% proximal to mid RCA.  Circumflex free of disease.  No LVEF documented.  2/17/2022 [facility] large sized anterior infarct with mild chris-infarct ischemia.  EF of 43%.  Hypertension  Dyslipidemia  Ongoing tobacco abuse  COVID 19 4/2021  Mild symptoms  Surgeries:  None       Allergies   Allergen Reactions   • Chantix [Varenicline Tartrate] Delirium   • Rosuvastatin Calcium Myalgia         Current Outpatient Medications:   •  aspirin 81 MG chewable tablet, Chew 1 tablet Daily., Disp: , Rfl:   •  atorvastatin (LIPITOR) 40 MG tablet, TAKE 1 TABLET BY MOUTH EVERY DAY, Disp: 90 tablet, Rfl: 1  •  clopidogrel (PLAVIX) 75 MG tablet, Take 1 tablet by mouth Daily., Disp: , Rfl:   •  lisinopril (PRINIVIL,ZESTRIL) 5 MG  "tablet, Take 1 tablet by mouth Daily., Disp: , Rfl:   •  metoprolol tartrate (LOPRESSOR) 25 MG tablet, Take 0.5 tablets by mouth Daily., Disp: , Rfl:   •  nitroglycerin (NITROLINGUAL) 0.4 MG/SPRAY spray, Place 1 spray under the tongue Every 5 (Five) Minutes As Needed for Chest Pain., Disp: , Rfl:   •  Omega-3 Fatty Acids (fish oil) 1000 MG capsule capsule, Take  by mouth Daily With Breakfast., Disp: , Rfl:   •  vitamin C (ASCORBIC ACID) 250 MG tablet, Take 1 tablet by mouth Daily., Disp: , Rfl:         History of Present Illness    Patient is a 53-year-old  male who presents today for annual follow-up of coronary artery disease.  Since last being seen patient notes overall doing well.  He denies any significant chest pain, pressure, tightness.  Denies any increasing shortness of breath.  No reported syncope, presyncope, or edema.  Continues to smoke without plans for cessation.    The following portions of the patient's history were reviewed and updated as appropriate: allergies, current medications, past family history, past medical history, past social history, past surgical history and problem list.      Social History     Tobacco Use   • Smoking status: Every Day     Packs/day: 1.00     Years: 30.00     Additional pack years: 0.00     Total pack years: 30.00     Types: Cigarettes   Substance Use Topics   • Alcohol use: Not Currently   • Drug use: Never         ROS       Objective:   /77   Pulse 73   Ht 182.9 cm (72\")   Wt 94.1 kg (207 lb 6.4 oz)   SpO2 94%   BMI 28.13 kg/m²         Vitals reviewed.   Constitutional:       Appearance: Well-developed and not in distress.   Neck:      Vascular: No JVD.      Trachea: No tracheal deviation.   Pulmonary:      Effort: Pulmonary effort is normal.      Breath sounds: Normal breath sounds.   Cardiovascular:      Normal rate. Regular rhythm.      Murmurs: There is no murmur.      Comments: Right carotid bruit noted  Edema:     Peripheral edema absent. "   Musculoskeletal:         General: No deformity. Skin:     General: Skin is warm and dry.   Neurological:      Mental Status: Alert and oriented to person, place, and time.         Procedures          Assessment:   Assessment & Plan     Diagnoses and all orders for this visit:    1. Coronary artery disease involving native coronary artery of native heart without angina pectoris (Primary), stable.  No active angina.  On aspirin.    2. Pure hypercholesterolemia, stable.  Labs with primary care.  On statin.    3. Essential hypertension, controlled.  On ACE inhibitor and beta-blocker.    4. Bruit of right carotid artery, no previous carotid duplex/evaluation.      Plan:  Patient overall stable from cardiac standpoint.  Given abnormal physical examination findings and ongoing tobacco abuse as well as history of coronary disease we will obtain carotid duplex in the near term.  Continue current cardiac medications.  Follow-up in 1 years time or sooner if needed.     JOMAR Smalls           Dictated utilizing Dragon dictation

## 2024-09-03 RX ORDER — NITROGLYCERIN 400 UG/1
1 SPRAY ORAL
Qty: 25 EACH | Refills: 5 | Status: SHIPPED | OUTPATIENT
Start: 2024-09-03

## 2024-09-03 RX ORDER — ATORVASTATIN CALCIUM 40 MG/1
40 TABLET, FILM COATED ORAL DAILY
Qty: 90 TABLET | Refills: 3 | Status: SHIPPED | OUTPATIENT
Start: 2024-09-03

## 2024-09-03 RX ORDER — METOPROLOL TARTRATE 25 MG/1
12.5 TABLET, FILM COATED ORAL DAILY
Qty: 90 TABLET | Refills: 3 | Status: SHIPPED | OUTPATIENT
Start: 2024-09-03

## 2024-09-03 RX ORDER — LISINOPRIL 5 MG/1
5 TABLET ORAL DAILY
Qty: 90 TABLET | Refills: 3 | Status: SHIPPED | OUTPATIENT
Start: 2024-09-03

## 2024-09-03 RX ORDER — CLOPIDOGREL BISULFATE 75 MG/1
75 TABLET ORAL DAILY
Qty: 90 TABLET | Refills: 3 | Status: SHIPPED | OUTPATIENT
Start: 2024-09-03

## 2025-08-25 RX ORDER — LISINOPRIL 5 MG/1
5 TABLET ORAL DAILY
Qty: 30 TABLET | Refills: 1 | Status: SHIPPED | OUTPATIENT
Start: 2025-08-25

## 2025-08-25 RX ORDER — CLOPIDOGREL BISULFATE 75 MG/1
75 TABLET ORAL DAILY
Qty: 30 TABLET | Refills: 1 | Status: SHIPPED | OUTPATIENT
Start: 2025-08-25

## 2025-08-25 RX ORDER — ATORVASTATIN CALCIUM 40 MG/1
40 TABLET, FILM COATED ORAL DAILY
Qty: 30 TABLET | Refills: 1 | Status: SHIPPED | OUTPATIENT
Start: 2025-08-25